# Patient Record
Sex: MALE | Race: WHITE | NOT HISPANIC OR LATINO | Employment: FULL TIME | URBAN - NONMETROPOLITAN AREA
[De-identification: names, ages, dates, MRNs, and addresses within clinical notes are randomized per-mention and may not be internally consistent; named-entity substitution may affect disease eponyms.]

---

## 2020-07-23 ENCOUNTER — APPOINTMENT (EMERGENCY)
Dept: CT IMAGING | Facility: HOSPITAL | Age: 45
End: 2020-07-23

## 2020-07-23 ENCOUNTER — APPOINTMENT (EMERGENCY)
Dept: RADIOLOGY | Facility: HOSPITAL | Age: 45
End: 2020-07-23

## 2020-07-23 ENCOUNTER — HOSPITAL ENCOUNTER (EMERGENCY)
Facility: HOSPITAL | Age: 45
Discharge: HOME | End: 2020-07-23
Attending: EMERGENCY MEDICINE | Admitting: EMERGENCY MEDICINE

## 2020-07-23 VITALS
TEMPERATURE: 96.9 F | OXYGEN SATURATION: 98 % | RESPIRATION RATE: 16 BRPM | SYSTOLIC BLOOD PRESSURE: 138 MMHG | HEART RATE: 77 BPM | DIASTOLIC BLOOD PRESSURE: 81 MMHG | WEIGHT: 247.36 LBS

## 2020-07-23 DIAGNOSIS — S62.646A CLOSED NONDISPLACED FRACTURE OF PROXIMAL PHALANX OF RIGHT LITTLE FINGER, INITIAL ENCOUNTER: Primary | ICD-10-CM
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
## 2020-07-23 DIAGNOSIS — S40.019A SHOULDER CONTUSION: ICD-10-CM

## 2020-07-23 LAB
ABO GROUP BLD: NORMAL
ALBUMIN SERPL BCP-MCNC: 3.8 G/DL (ref 3.5–5)
ALP SERPL-CCNC: 60 U/L (ref 46–116)
ALT SERPL W P-5'-P-CCNC: 44 U/L (ref 12–78)
ANION GAP SERPL CALCULATED.3IONS-SCNC: 13 MMOL/L (ref 4–13)
APTT PPP: 22 SECONDS (ref 23–37)
AST SERPL W P-5'-P-CCNC: 47 U/L (ref 5–45)
BACTERIA UR QL AUTO: ABNORMAL /HPF
BASOPHILS # BLD AUTO: 0.07 THOUSANDS/ΜL (ref 0–0.1)
BASOPHILS NFR BLD AUTO: 1 % (ref 0–1)
BILIRUB SERPL-MCNC: 0.41 MG/DL (ref 0.2–1)
BILIRUB UR QL STRIP: NEGATIVE
BLD GP AB SCN SERPL QL: NEGATIVE
BUN SERPL-MCNC: 14 MG/DL (ref 5–25)
CALCIUM SERPL-MCNC: 9.3 MG/DL (ref 8.3–10.1)
CHLORIDE SERPL-SCNC: 101 MMOL/L (ref 100–108)
CLARITY UR: CLEAR
CO2 SERPL-SCNC: 26 MMOL/L (ref 21–32)
COLOR UR: YELLOW
CREAT SERPL-MCNC: 1.19 MG/DL (ref 0.6–1.3)
EOSINOPHIL # BLD AUTO: 0.26 THOUSAND/ΜL (ref 0–0.61)
EOSINOPHIL NFR BLD AUTO: 2 % (ref 0–6)
ERYTHROCYTE [DISTWIDTH] IN BLOOD BY AUTOMATED COUNT: 14.1 % (ref 11.6–15.1)
GFR SERPL CREATININE-BSD FRML MDRD: 72 ML/MIN/1.73SQ M
GLUCOSE SERPL-MCNC: 183 MG/DL (ref 65–140)
GLUCOSE UR STRIP-MCNC: ABNORMAL MG/DL
HCT VFR BLD AUTO: 47.2 % (ref 36.5–49.3)
HGB BLD-MCNC: 15.9 G/DL (ref 12–17)
HGB UR QL STRIP.AUTO: ABNORMAL
IMM GRANULOCYTES # BLD AUTO: 0.05 THOUSAND/UL (ref 0–0.2)
IMM GRANULOCYTES NFR BLD AUTO: 0 % (ref 0–2)
INR PPP: 0.89 (ref 0.84–1.19)
KETONES UR STRIP-MCNC: NEGATIVE MG/DL
LACTATE SERPL-SCNC: 1.8 MMOL/L (ref 0.5–2)
LEUKOCYTE ESTERASE UR QL STRIP: ABNORMAL
LYMPHOCYTES # BLD AUTO: 2.57 THOUSANDS/ΜL (ref 0.6–4.47)
LYMPHOCYTES NFR BLD AUTO: 22 % (ref 14–44)
MCH RBC QN AUTO: 27.8 PG (ref 26.8–34.3)
MCHC RBC AUTO-ENTMCNC: 33.7 G/DL (ref 31.4–37.4)
MCV RBC AUTO: 83 FL (ref 82–98)
MONOCYTES # BLD AUTO: 0.61 THOUSAND/ΜL (ref 0.17–1.22)
MONOCYTES NFR BLD AUTO: 5 % (ref 4–12)
MUCOUS THREADS UR QL AUTO: ABNORMAL
NEUTROPHILS # BLD AUTO: 8.34 THOUSANDS/ΜL (ref 1.85–7.62)
NEUTS SEG NFR BLD AUTO: 70 % (ref 43–75)
NITRITE UR QL STRIP: NEGATIVE
NON-SQ EPI CELLS URNS QL MICRO: ABNORMAL /HPF
NRBC BLD AUTO-RTO: 0 /100 WBCS
PH UR STRIP.AUTO: 7 [PH]
PLATELET # BLD AUTO: 262 THOUSANDS/UL (ref 149–390)
PMV BLD AUTO: 10.6 FL (ref 8.9–12.7)
POTASSIUM SERPL-SCNC: 3.3 MMOL/L (ref 3.5–5.3)
PROT SERPL-MCNC: 7.1 G/DL (ref 6.4–8.2)
PROT UR STRIP-MCNC: NEGATIVE MG/DL
PROTHROMBIN TIME: 12 SECONDS (ref 11.6–14.5)
RBC # BLD AUTO: 5.71 MILLION/UL (ref 3.88–5.62)
RBC #/AREA URNS AUTO: ABNORMAL /HPF
RH BLD: POSITIVE
SODIUM SERPL-SCNC: 140 MMOL/L (ref 136–145)
SP GR UR STRIP.AUTO: 1.01 (ref 1–1.03)
SPECIMEN EXPIRATION DATE: NORMAL
TROPONIN I SERPL-MCNC: <0.02 NG/ML
UROBILINOGEN UR QL STRIP.AUTO: 0.2 E.U./DL
WBC # BLD AUTO: 11.9 THOUSAND/UL (ref 4.31–10.16)
WBC #/AREA URNS AUTO: ABNORMAL /HPF

## 2020-07-23 PROCEDURE — 73130 X-RAY EXAM OF HAND: CPT

## 2020-07-23 PROCEDURE — 85730 THROMBOPLASTIN TIME PARTIAL: CPT | Performed by: PHYSICIAN ASSISTANT

## 2020-07-23 PROCEDURE — 73110 X-RAY EXAM OF WRIST: CPT

## 2020-07-23 PROCEDURE — 85610 PROTHROMBIN TIME: CPT | Performed by: PHYSICIAN ASSISTANT

## 2020-07-23 PROCEDURE — 83605 ASSAY OF LACTIC ACID: CPT | Performed by: PHYSICIAN ASSISTANT

## 2020-07-23 PROCEDURE — 96374 THER/PROPH/DIAG INJ IV PUSH: CPT

## 2020-07-23 PROCEDURE — 73070 X-RAY EXAM OF ELBOW: CPT

## 2020-07-23 PROCEDURE — 99285 EMERGENCY DEPT VISIT HI MDM: CPT

## 2020-07-23 PROCEDURE — 72125 CT NECK SPINE W/O DYE: CPT

## 2020-07-23 PROCEDURE — 71260 CT THORAX DX C+: CPT

## 2020-07-23 PROCEDURE — 36415 COLL VENOUS BLD VENIPUNCTURE: CPT | Performed by: PHYSICIAN ASSISTANT

## 2020-07-23 PROCEDURE — 80053 COMPREHEN METABOLIC PANEL: CPT | Performed by: PHYSICIAN ASSISTANT

## 2020-07-23 PROCEDURE — 96361 HYDRATE IV INFUSION ADD-ON: CPT

## 2020-07-23 PROCEDURE — 85025 COMPLETE CBC W/AUTO DIFF WBC: CPT | Performed by: PHYSICIAN ASSISTANT

## 2020-07-23 PROCEDURE — 74177 CT ABD & PELVIS W/CONTRAST: CPT

## 2020-07-23 PROCEDURE — 96376 TX/PRO/DX INJ SAME DRUG ADON: CPT

## 2020-07-23 PROCEDURE — 99285 EMERGENCY DEPT VISIT HI MDM: CPT | Performed by: EMERGENCY MEDICINE

## 2020-07-23 PROCEDURE — 73030 X-RAY EXAM OF SHOULDER: CPT

## 2020-07-23 PROCEDURE — 81001 URINALYSIS AUTO W/SCOPE: CPT | Performed by: PHYSICIAN ASSISTANT

## 2020-07-23 PROCEDURE — 86900 BLOOD TYPING SEROLOGIC ABO: CPT | Performed by: PHYSICIAN ASSISTANT

## 2020-07-23 PROCEDURE — 93005 ELECTROCARDIOGRAM TRACING: CPT

## 2020-07-23 PROCEDURE — 70450 CT HEAD/BRAIN W/O DYE: CPT

## 2020-07-23 PROCEDURE — 86901 BLOOD TYPING SEROLOGIC RH(D): CPT | Performed by: PHYSICIAN ASSISTANT

## 2020-07-23 PROCEDURE — 86850 RBC ANTIBODY SCREEN: CPT | Performed by: PHYSICIAN ASSISTANT

## 2020-07-23 PROCEDURE — 84484 ASSAY OF TROPONIN QUANT: CPT | Performed by: PHYSICIAN ASSISTANT

## 2020-07-23 PROCEDURE — 73501 X-RAY EXAM HIP UNI 1 VIEW: CPT

## 2020-07-23 RX ORDER — POTASSIUM CHLORIDE 20 MEQ/1
40 TABLET, EXTENDED RELEASE ORAL ONCE
Status: COMPLETED | OUTPATIENT
Start: 2020-07-23 | End: 2020-07-23

## 2020-07-23 RX ORDER — RANITIDINE 150 MG/1
150 TABLET ORAL
COMMUNITY
Start: 2017-08-22

## 2020-07-23 RX ORDER — LOSARTAN POTASSIUM 100 MG/1
TABLET ORAL
COMMUNITY
Start: 2018-09-24

## 2020-07-23 RX ORDER — TRAMADOL HYDROCHLORIDE 50 MG/1
50 TABLET ORAL EVERY 6 HOURS PRN
Qty: 12 TABLET | Refills: 0 | Status: SHIPPED | OUTPATIENT
Start: 2020-07-23 | End: 2020-07-26

## 2020-07-23 RX ORDER — DULOXETIN HYDROCHLORIDE 60 MG/1
CAPSULE, DELAYED RELEASE ORAL
COMMUNITY
Start: 2018-09-24

## 2020-07-23 RX ORDER — MONTELUKAST SODIUM 10 MG/1
TABLET ORAL
COMMUNITY
Start: 2020-05-06

## 2020-07-23 RX ORDER — MELOXICAM 7.5 MG/1
TABLET ORAL
COMMUNITY
Start: 2020-02-06

## 2020-07-23 RX ORDER — DILTIAZEM HYDROCHLORIDE EXTENDED-RELEASE TABLETS 360 MG/1
1 TABLET, EXTENDED RELEASE ORAL
COMMUNITY
Start: 2020-01-29

## 2020-07-23 RX ORDER — MORPHINE SULFATE 4 MG/ML
4 INJECTION, SOLUTION INTRAMUSCULAR; INTRAVENOUS ONCE
Status: COMPLETED | OUTPATIENT
Start: 2020-07-23 | End: 2020-07-23

## 2020-07-23 RX ORDER — TRIAMTERENE AND HYDROCHLOROTHIAZIDE 37.5; 25 MG/1; MG/1
CAPSULE ORAL
COMMUNITY
Start: 2020-04-09

## 2020-07-23 RX ORDER — DILTIAZEM HYDROCHLORIDE 240 MG/1
CAPSULE, EXTENDED RELEASE ORAL
COMMUNITY
Start: 2018-04-25 | End: 2020-07-23

## 2020-07-23 RX ORDER — ONDANSETRON 4 MG/1
4 TABLET, FILM COATED ORAL
COMMUNITY
Start: 2020-03-30

## 2020-07-23 RX ORDER — GABAPENTIN 600 MG/1
600 TABLET ORAL
COMMUNITY
Start: 2020-03-23

## 2020-07-23 RX ORDER — TRIAMCINOLONE ACETONIDE 0.25 MG/G
CREAM TOPICAL
COMMUNITY
Start: 2017-08-22

## 2020-07-23 RX ORDER — ATORVASTATIN CALCIUM 40 MG/1
TABLET, FILM COATED ORAL
COMMUNITY
Start: 2018-08-31

## 2020-07-23 RX ADMIN — POTASSIUM CHLORIDE 40 MEQ: 1500 TABLET, EXTENDED RELEASE ORAL at 13:52

## 2020-07-23 RX ADMIN — IOHEXOL 100 ML: 350 INJECTION, SOLUTION INTRAVENOUS at 12:32

## 2020-07-23 RX ADMIN — MORPHINE SULFATE 4 MG: 4 INJECTION INTRAVENOUS at 11:22

## 2020-07-23 RX ADMIN — MORPHINE SULFATE 4 MG: 4 INJECTION INTRAVENOUS at 13:52

## 2020-07-23 RX ADMIN — SODIUM CHLORIDE 1000 ML: 0.9 INJECTION, SOLUTION INTRAVENOUS at 11:23

## 2020-07-23 NOTE — ED PROVIDER NOTES
History  Chief Complaint   Patient presents with   Nebradford Mcgregor Fall - Major     patient was climbing on a roof and it was wet and his foot slipped and grabbed the ladder he was on and then the ladder slipped and he fell over 10'      Patient is a 25-year-old male presents emergency department today with a complaint of fall  Patient states that prior to arrival he was working on a roof, and went to step on his ladder, but slipped due to the area being wet  He states that he was approximately 12 ft off the ground  He states that when he fell he held onto the ladder and will the ladder down  He states that he fell and on his left side injuring his shoulder  Patient states that he did hit his head however denies any loss of consciousness, dizziness, blurred vision, nausea or vomiting  Patient admits to having right-sided neck pain, right shoulder pain, right side pain  Patient denies any headache, chest pain, shortness of breath, abdominal pain, hip pain, , leg pain numbness or tingling in his extremities, bowel or bladder incontinence, lower leg weakness  Patient states that he ambulated from the scene and drove himself here for evaluation  History provided by:  Patient   used: No    Fall - Major   Location:  Fall from roof on job site pain in right shoulder, right neck, and right side  Quality:  Sharp pain  Severity:  Moderate  Onset quality:  Sudden  Timing:  Constant  Progression:  Unchanged  Chronicity:  New  Context:  Fell off roof  Relieved by:  Rest  Worsened by:   Movement  Ineffective treatments:  Rest  Associated symptoms: no abdominal pain, no chest pain, no congestion, no cough, no ear pain, no fatigue, no fever, no headaches, no loss of consciousness, no myalgias, no nausea, no rash, no shortness of breath, no sore throat, no vomiting and no wheezing    Shoulder Pain   Location:  Shoulder  Shoulder location:  R shoulder  Injury: yes    Mechanism of injury: fall    Fall:     Fall occurred:  From a roof    Height of fall:  12 feet    Impact surface:  Grass    Point of impact: right side  Entrapped after fall: no    Pain details:     Quality:  Sharp    Radiates to:  Does not radiate    Severity:  Moderate    Onset quality:  Sudden    Duration: since fall     Timing:  Constant    Progression:  Unchanged  Handedness:  Right-handed  Dislocation: no    Foreign body present:  No foreign bodies  Tetanus status:  Unknown  Prior injury to area:  No  Relieved by:  None tried  Worsened by:  Nothing  Ineffective treatments:  None tried  Associated symptoms: neck pain    Associated symptoms: no back pain, no decreased range of motion, no fatigue, no fever, no muscle weakness, no numbness, no stiffness, no swelling and no tingling    Risk factors: no concern for non-accidental trauma, no known bone disorder, no frequent fractures and no recent illness        Prior to Admission Medications   Prescriptions Last Dose Informant Patient Reported? Taking?    DULoxetine (CYMBALTA) 60 mg delayed release capsule   Yes Yes   Sig: take 1 capsule by mouth once daily   Dapagliflozin Propanediol 5 MG TABS   Yes Yes   Sig: Take 5 mg by mouth   atorvastatin (LIPITOR) 40 mg tablet   Yes Yes   Sig: take 1 tablet by mouth once daily   canagliflozin (INVOKANA) 100 mg   Yes Yes   Sig: take 1 tablet by mouth daily before the first meal of the day   diltiazem (CARDIZEM LA) 360 MG 24 hr tablet   Yes Yes   Sig: Take 1 tablet by mouth   gabapentin (NEURONTIN) 600 MG tablet   Yes Yes   Sig: Take 600 mg by mouth   losartan (COZAAR) 100 MG tablet   Yes Yes   Sig: take 1 tablet by mouth once daily   meloxicam (MOBIC) 7 5 mg tablet   Yes Yes   Sig: take 1 tablet by mouth daily for pain   metFORMIN (GLUCOPHAGE) 1000 MG tablet   Yes Yes   Sig: take 1 tablet by mouth twice a day WITH MORNING AND EVENING MEALS   montelukast (SINGULAIR) 10 mg tablet   Yes Yes   Sig: take 1 tablet by mouth daily   ondansetron (ZOFRAN) 4 mg tablet   Yes Yes Sig: Take 4 mg by mouth   ranitidine (ZANTAC) 150 mg tablet   Yes Yes   Sig: Take 150 mg by mouth   triamcinolone (KENALOG) 0 025 % cream   Yes Yes   Sig: Apply topically   triamterene-hydrochlorothiazide (DYAZIDE) 37 5-25 mg per capsule   Yes Yes   Sig: take 1 capsule by mouth daily      Facility-Administered Medications: None       History reviewed  No pertinent past medical history  Past Surgical History:   Procedure Laterality Date    LAMINECTOMY         History reviewed  No pertinent family history  I have reviewed and agree with the history as documented  E-Cigarette/Vaping     E-Cigarette/Vaping Substances     Social History     Tobacco Use    Smoking status: Never Smoker    Smokeless tobacco: Current User     Types: Chew   Substance Use Topics    Alcohol use: Never     Frequency: Never    Drug use: Not on file     Comment: had card for medical marijuana  Review of Systems   Constitutional: Negative for chills, fatigue and fever  HENT: Negative for congestion, ear pain and sore throat  Eyes: Negative for pain and visual disturbance  Respiratory: Negative for cough, shortness of breath, wheezing and stridor  Cardiovascular: Negative for chest pain and palpitations  Gastrointestinal: Negative for abdominal pain, nausea and vomiting  Genitourinary: Negative for dysuria and hematuria  Musculoskeletal: Positive for neck pain  Negative for arthralgias, back pain, myalgias and stiffness  Skin: Negative for color change and rash  Neurological: Negative for seizures, loss of consciousness, speech difficulty and headaches  All other systems reviewed and are negative  Physical Exam  Physical Exam   Constitutional: He is oriented to person, place, and time  He appears well-developed and well-nourished  No distress  HENT:   Head: Normocephalic and atraumatic  Mouth/Throat: Oropharynx is clear and moist    Eyes: Pupils are equal, round, and reactive to light   EOM are normal    Neck: Muscular tenderness present  No spinous process tenderness present  Cardiovascular: Normal rate, regular rhythm and normal heart sounds  No murmur heard  Pulmonary/Chest: Effort normal and breath sounds normal  No respiratory distress  Abdominal: Soft  Bowel sounds are normal  There is no tenderness  Musculoskeletal:        Right shoulder: He exhibits tenderness and bony tenderness  He exhibits no deformity, no laceration and normal strength  Right elbow: Normal        Right wrist: He exhibits tenderness  He exhibits no swelling  Right hip: Normal         Left hip: Normal         Thoracic back: Normal         Lumbar back: Normal         Right forearm: Normal         Right hand: He exhibits tenderness  He exhibits normal capillary refill  Normal sensation noted  Normal strength noted  Hands:  Neurological: He is alert and oriented to person, place, and time  No cranial nerve deficit or sensory deficit  He displays a negative Romberg sign  Gait normal  GCS eye subscore is 4  GCS verbal subscore is 5  GCS motor subscore is 6  Ambulated to stretcher without difficulty    Skin: Skin is warm and dry  Capillary refill takes less than 2 seconds  Psychiatric: He has a normal mood and affect  His behavior is normal    Nursing note and vitals reviewed        Vital Signs  ED Triage Vitals   Temperature Pulse Respirations Blood Pressure SpO2   07/23/20 1040 07/23/20 1040 07/23/20 1040 07/23/20 1040 07/23/20 1045   (!) 96 9 °F (36 1 °C) 86 18 (!) 165/104 98 %      Temp Source Heart Rate Source Patient Position - Orthostatic VS BP Location FiO2 (%)   07/23/20 1040 07/23/20 1040 07/23/20 1100 07/23/20 1100 --   Temporal Monitor Sitting Right arm       Pain Score       07/23/20 1122       Worst Possible Pain           Vitals:    07/23/20 1400 07/23/20 1415 07/23/20 1430 07/23/20 1447   BP: 141/87 139/85 138/81 138/81   Pulse: 88 84 77 77   Patient Position - Orthostatic VS:    Sitting Visual Acuity  Visual Acuity      Most Recent Value   L Pupil Size (mm)  3   R Pupil Size (mm)  3          ED Medications  Medications   sodium chloride 0 9 % bolus 1,000 mL (0 mL Intravenous Stopped 7/23/20 1223)   morphine (PF) 4 mg/mL injection 4 mg (4 mg Intravenous Given 7/23/20 1122)   potassium chloride (K-DUR,KLOR-CON) CR tablet 40 mEq (40 mEq Oral Given 7/23/20 1352)   iohexol (OMNIPAQUE) 350 MG/ML injection (MULTI-DOSE) 100 mL (100 mL Intravenous Given 7/23/20 1232)   morphine (PF) 4 mg/mL injection 4 mg (4 mg Intravenous Given 7/23/20 1352)       Diagnostic Studies  Results Reviewed     Procedure Component Value Units Date/Time    Urine Microscopic [[id number]]  (Abnormal) Collected:  07/23/20 1323    Lab Status:  Final result Specimen:  Urine, Clean Catch Updated:  07/23/20 1334     RBC, UA 4-10 /hpf      WBC, UA 0-1 /hpf      Epithelial Cells Occasional /hpf      Bacteria, UA None Seen /hpf      MUCUS THREADS Occasional    UA w Reflex to Microscopic w Reflex to Culture [[id number]]  (Abnormal) Collected:  07/23/20 1323    Lab Status:  Final result Specimen:  Urine, Clean Catch Updated:  07/23/20 1328     Color, UA Yellow     Clarity, UA Clear     Specific Gravity, UA 1 015     pH, UA 7 0     Leukocytes, UA Elevated glucose may cause decreased leukocyte values  See urine microscopic for [facility] result/     Nitrite, UA Negative     Protein, UA Negative mg/dl      Glucose, UA >=1000 (1%) mg/dl      Ketones, UA Negative mg/dl      Urobilinogen, UA 0 2 E U /dl      Bilirubin, UA Negative     Blood, UA Trace-Intact    Lactic acid [[id number]]  (Normal) Collected:  07/23/20 1120    Lab Status:  Final result Specimen:  Blood from Line, Venous Updated:  07/23/20 1204     LACTIC ACID 1 8 mmol/L     Narrative:       Result may be elevated if tourniquet was used during collection      Comprehensive metabolic panel [[id number]]  (Abnormal) Collected:  07/23/20 1120    Lab Status:  Final result Specimen:  Blood from Line, Venous Updated:  07/23/20 1159     Sodium 140 mmol/L      Potassium 3 3 mmol/L      Chloride 101 mmol/L      CO2 26 mmol/L      ANION GAP 13 mmol/L      BUN 14 mg/dL      Creatinine 1 19 mg/dL      Glucose 183 mg/dL      Calcium 9 3 mg/dL      AST 47 U/L      ALT 44 U/L      Alkaline Phosphatase 60 U/L      Total Protein 7 1 g/dL      Albumin 3 8 g/dL      Total Bilirubin 0 41 mg/dL      eGFR 72 ml/min/1 73sq m     Narrative:       [place] guidelines for Chronic Kidney Disease (CKD):     Stage 1 with normal or high GFR (GFR > 90 mL/min/1 73 square meters)    Stage 2 Mild CKD (GFR = 60-89 mL/min/1 73 square meters)    Stage 3A Moderate CKD (GFR = 45-59 mL/min/1 73 square meters)    Stage 3B Moderate CKD (GFR = 30-44 mL/min/1 73 square meters)    Stage 4 Severe CKD (GFR = 15-29 mL/min/1 73 square meters)    Stage 5 End Stage CKD (GFR <15 mL/min/1 73 square meters)  Note: GFR calculation is accurate only with a steady state creatinine    Troponin I [[id number]]  (Normal) Collected:  07/23/20 1127    Lab Status:  Final result Specimen:  Blood from Arm, Left Updated:  07/23/20 1156     Troponin I <0 02 ng/mL     Protime-INR [[id number]]  (Normal) Collected:  07/23/20 1120    Lab Status:  Final result Specimen:  Blood from Line, Venous Updated:  07/23/20 1148     Protime 12 0 seconds      INR 0 89    APTT [[id number]]  (Abnormal) Collected:  07/23/20 1120    Lab Status:  Final result Specimen:  Blood from Line, Venous Updated:  07/23/20 1148     PTT 22 seconds     CBC and differential [[id number]]  (Abnormal) Collected:  07/23/20 1120    Lab Status:  Final result Specimen:  Blood from Line, Venous Updated:  07/23/20 1132     WBC 11 90 Thousand/uL      RBC 5 71 Million/uL      Hemoglobin 15 9 g/dL      Hematocrit 47 2 %      MCV 83 fL      MCH 27 8 pg      MCHC 33 7 g/dL      RDW 14 1 %      MPV 10 6 fL      Platelets 829 Thousands/uL      nRBC 0 /100 WBCs      Neutrophils Relative 70 % Immat GRANS % 0 %      Lymphocytes Relative 22 %      Monocytes Relative 5 %      Eosinophils Relative 2 %      Basophils Relative 1 %      Neutrophils Absolute 8 34 Thousands/µL      Immature Grans Absolute 0 05 Thousand/uL      Lymphocytes Absolute 2 57 Thousands/µL      Monocytes Absolute 0 61 Thousand/µL      Eosinophils Absolute 0 26 Thousand/µL      Basophils Absolute 0 07 Thousands/µL                  CT chest abdomen pelvis w contrast   Final Result by Connie Obregon MD (07/23 1248)      No visceral injury in the chest, abdomen or pelvis  No acute fracture  Hepatomegaly and hepatic steatosis  Workstation performed: [id number]         CT head without contrast   Final Result by Connie Obregon MD (07/23 1249)      No acute intracranial abnormality  Workstation performed: [id number]         CT cervical spine without contrast   Final Result by Connie Obregon MD (07/23 1242)      No cervical spine fracture or traumatic malalignment  Workstation performed: [id number]         XR shoulder 2+ views RIGHT   Final Result by Jesus Quintana MD (07/23 1250)      No acute osseous abnormality  Workstation performed: [id number]         XR elbow 2 views RIGHT   Final Result by Jesus Quintana MD (07/23 1250)      No acute osseous abnormality  Workstation performed: [id number]         XR wrist 3+ views RIGHT   Final Result by Jesus Quintana MD (07/23 1252)      No acute osseous abnormality  Workstation performed: [id number]         XR hip/pelv 1 vw RIGHT if performed   Final Result by Jesus Quintana MD (07/23 1251)      No acute osseous abnormality        Workstation performed: [id number]         XR hand 3+ views RIGHT   Final Result by Jesus Quintana MD (07/23 1253)      Suspected acute transverse essentially nondisplaced fracture base 5th proximal phalanx      Workstation performed: [id number]                    Procedures  ECG 12 Lead Documentation Only  Date/Time: 7/23/2020 11:05 AM  Performed by: Sam Jensen PA-C  Authorized by: Sam Jensen PA-C     Indications / Diagnosis:  Fall  ECG reviewed by me, the ED Provider: yes    Patient location:  ED  Previous ECG:     Previous ECG:  Unavailable    Comparison to cardiac monitor: Yes    Interpretation:     Interpretation: non-specific    Rate:     ECG rate:  78    ECG rate assessment: normal    Rhythm:     Rhythm: sinus rhythm    Ectopy:     Ectopy: none    QRS:     QRS axis:  Normal    QRS intervals:  Normal  Conduction:     Conduction: normal    ST segments:     ST segments:  Normal  T waves:     T waves: normal      Splint application  Date/Time: 7/23/2020 3:31 PM  Performed by: Sam Jensen PA-C  Authorized by: Sam Jensen PA-C     Patient location:  Bedside  Performing Provider:  PA  Other Assisting Provider: Mallory    Consent:     Consent obtained:  Verbal    Consent given by:  Patient    Risks discussed:  Discoloration, numbness, pain and swelling    Alternatives discussed:  No treatment  Universal protocol:     Procedure explained and questions answered to patient or proxy's satisfaction: yes      Patient identity confirmed:  Verbally with patient  Indication:     Indications: fracture    Pre-procedure details:     Sensation:  Normal    Skin color:  Pink  Procedure details:     Laterality:  Right    Location:  Finger    Finger:  R small finger    Strapping: no      Splint type:  Finger splint, static    Supplies:  Cotton padding, elastic bandage and Ortho-Glass  Post-procedure details:     Pain:  Improved    Sensation:  Normal    Neurovascular Exam: skin pink, capillary refill <2 sec, normal pulses and skin intact, warm, and dry      Patient tolerance of procedure: Tolerated well, no immediate complications             ED Course       US AUDIT      Most Recent Value   Initial Alcohol Screen: US AUDIT-C    1   How often do you have a drink containing alcohol?  0 Filed at: 07/23/2020 1042   2  How many drinks containing alcohol do you have on a typical day you are drinking? 0 Filed at: 07/23/2020 1042   3a  Male UNDER 65: How often do you have five or more drinks on one occasion? 0 Filed at: 07/23/2020 1042   Audit-C Score  0 Filed at: 07/23/2020 1042                  ROBERT/DAST-10      Most Recent Value   How many times in the past year have you    Used an illegal drug or used a prescription medication for non-medical reasons? Never Filed at: 07/23/2020 1042            MDM  Number of Diagnoses or Management Options  Closed nondisplaced fracture of proximal phalanx of right little finger, initial encounter:   Fall, initial encounter:   Shoulder contusion:   Diagnosis management comments: Differential diagnosis included but was not limited to subdural hematoma, intracranial hemorrhage, cervical spine fracture, rib fracture, liver contusion versus laceration splenic laceration, contusion, renal contusion, shoulder fracture, wrist fracture, finger fracture, concussion    Patient has a right little finger proximal phalanx fracture which was splinted emergency department  Patient's imaging was negative for any traumatic findings  Patient was given referral for Orthopedics   Patient expressed understanding and was in agreement with treatment plan          Amount and/or Complexity of Data Reviewed  Clinical lab tests: ordered and reviewed  Tests in the radiology section of CPT®: ordered and reviewed  Decide to obtain previous medical records or to obtain history from someone other than the patient: yes  Review and summarize past medical records: yes  Independent visualization of images, tracings, or specimens: yes    Risk of Complications, Morbidity, and/or Mortality  Presenting problems: high  Diagnostic procedures: high  Management options: high    Patient Progress  Patient progress: improved        Disposition  Final diagnoses:   Closed nondisplaced fracture of proximal phalanx of right little finger, initial encounter   Fall, initial encounter   Shoulder contusion     Time reflects when diagnosis was documented in both MDM as applicable and the Disposition within this note     Time User Action Codes Description Comment    7/23/2020  1:55 PM Kavyaharry Lewis Add [D17 351V] Closed nondisplaced fracture of proximal phalanx of right little finger, initial encounter     7/23/2020  1:55 PM Kavyaharry Lewis Add [W19  STSK] Fall, initial encounter     7/23/2020  1:55 PM Kavya Lewis Add [S40 019A] Shoulder contusion       ED Disposition     ED Disposition Condition Date/Time Comment    Discharge Stable Thu Jul 23, 2020  1:55 PM Connor Baca discharge to home/self care              Follow-up Information     Follow up With Specialties Details Why [facility] Schedule an appointment as soon as possible for a visit   [address] [place] [address]  [phone]            Discharge Medication List as of 7/23/2020  1:58 PM      START taking these medications    Details   traMADol (ULTRAM) 50 mg tablet Take 1 tablet (50 mg total) by mouth every 6 (six) hours as needed for severe pain for up to 3 days, Starting Thu 7/23/2020, Until Sun 7/26/2020, Normal         CONTINUE these medications which have NOT CHANGED    Details   atorvastatin (LIPITOR) 40 mg tablet take 1 tablet by mouth once daily, Historical Med      canagliflozin (INVOKANA) 100 mg take 1 tablet by mouth daily before the first meal of the day, Historical Med      Dapagliflozin Propanediol 5 MG TABS Take 5 mg by mouth, Starting Tue 3/31/2020, Historical Med      diltiazem (CARDIZEM LA) 360 MG 24 hr tablet Take 1 tablet by mouth, Starting Wed 1/29/2020, Historical Med      DULoxetine (CYMBALTA) 60 mg delayed release capsule take 1 capsule by mouth once daily, Historical Med      gabapentin (NEURONTIN) 600 MG tablet Take 600 mg by mouth, Starting Mon 3/23/2020, Historical Med      losartan (COZAAR) 100 MG tablet take 1 tablet by mouth once daily, Historical Med      meloxicam (MOBIC) 7 5 mg tablet take 1 tablet by mouth daily for pain, Historical Med      metFORMIN (GLUCOPHAGE) 1000 MG tablet take 1 tablet by mouth twice a day WITH MORNING AND EVENING MEALS, Historical Med      montelukast (SINGULAIR) 10 mg tablet take 1 tablet by mouth daily, Historical Med      ondansetron (ZOFRAN) 4 mg tablet Take 4 mg by mouth, Starting Mon 3/30/2020, Historical Med      ranitidine (ZANTAC) 150 mg tablet Take 150 mg by mouth, Starting Tue 8/22/2017, Historical Med      triamcinolone (KENALOG) 0 025 % cream Apply topically, Starting Tue 8/22/2017, Historical Med      triamterene-hydrochlorothiazide (DYAZIDE) 37 5-25 mg per capsule take 1 capsule by mouth daily, Historical Med               PDMP Review       Value Time User    PDMP Reviewed  Yes 7/23/2020  1:56 PM Margarita Herrera PA-C          ED Provider  Electronically Signed by           Margarita Herrera PA-C  07/23/20 110 [facility] Jacobo Collazo MD  07/25/20 6414

## 2020-07-23 NOTE — ED NOTES
Patient transported to [facility] and [address], RN  07/23/20 367 [facility]  07/23/20 2087

## 2020-07-23 NOTE — ED ATTENDING ATTESTATION
7/23/2020  I, Theo Mccoy MD, saw and evaluated the patient  I have discussed the patient with the resident/non-physician practitioner and agree with the resident's/non-physician practitioner's findings, Plan of Care, and MDM as documented in the resident's/non-physician practitioner's note, except where noted  All available labs and Radiology studies were reviewed  I was present for key portions of any procedure(s) performed by the resident/non-physician practitioner and I was immediately available to provide assistance  At this point I agree with the current assessment done in the Emergency Department        ED Course         Critical Care Time  Procedures

## 2020-07-27 LAB
ATRIAL RATE: 78 BPM
P AXIS: 55 DEGREES
PR INTERVAL: 146 MS
QRS AXIS: 100 DEGREES
QRSD INTERVAL: 82 MS
QT INTERVAL: 396 MS
QTC INTERVAL: 451 MS
T WAVE AXIS: 60 DEGREES
VENTRICULAR RATE: 78 BPM

## 2020-07-27 PROCEDURE — 93010 ELECTROCARDIOGRAM REPORT: CPT | Performed by: INTERNAL MEDICINE
